# Patient Record
Sex: FEMALE | URBAN - METROPOLITAN AREA
[De-identification: names, ages, dates, MRNs, and addresses within clinical notes are randomized per-mention and may not be internally consistent; named-entity substitution may affect disease eponyms.]

---

## 2023-07-13 ENCOUNTER — OFFICE VISIT (OUTPATIENT)
Facility: LOCATION | Age: 63
End: 2023-07-13
Payer: MEDICARE

## 2023-07-13 DIAGNOSIS — H43.821 VITREOMACULAR ADHESION, RIGHT EYE: ICD-10-CM

## 2023-07-13 DIAGNOSIS — H40.1131 PRIMARY OPEN-ANGLE GLAUCOMA, MILD STAGE, BILATERAL: Primary | ICD-10-CM

## 2023-07-13 DIAGNOSIS — E10.3523: ICD-10-CM

## 2023-07-13 PROCEDURE — 92004 COMPRE OPH EXAM NEW PT 1/>: CPT | Performed by: OPHTHALMOLOGY

## 2023-07-13 ASSESSMENT — INTRAOCULAR PRESSURE
OD: 11
OS: 11

## 2023-07-13 NOTE — IMPRESSION/PLAN
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 Plan: Systemic diabetes with sign of diabetic retinopathy on dilated retinal examination today:  Discussed the pathophysiology of diabetes and its effect on the eye. Stressed the importance of strong glucose control.   Advised of importance of at least yearly dilated examinations, but to contact us immediately for any problems or concerns

## 2023-07-13 NOTE — IMPRESSION/PLAN
Impression: Primary open-angle glaucoma, mild stage, bilateral: H40.1131. Plan: Continue observation, following the findings of IOP, C/D ratio, HVF and OCT ONH. Encouraged patient compliance. 

[facility]m combigan BID OU

## 2023-07-13 NOTE — IMPRESSION/PLAN
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  Plan: Refer to [facility] for Right eye membrane stripping

## 2024-02-08 ENCOUNTER — OFFICE VISIT (OUTPATIENT)
Facility: LOCATION | Age: 64
End: 2024-02-08
Payer: MEDICARE

## 2024-02-08 DIAGNOSIS — H40.1131 PRIMARY OPEN-ANGLE GLAUCOMA, MILD STAGE, BILATERAL: Primary | ICD-10-CM

## 2024-02-08 DIAGNOSIS — H18.12 BULLOUS KERATOPATHY OF LEFT EYE: ICD-10-CM

## 2024-02-08 PROCEDURE — 92014 COMPRE OPH EXAM EST PT 1/>: CPT | Performed by: OPHTHALMOLOGY

## 2024-02-08 PROCEDURE — 92134 CPTRZ OPH DX IMG PST SGM RTA: CPT | Performed by: OPHTHALMOLOGY

## 2024-02-08 RX ORDER — ATROPINE SULFATE 10 MG/ML
1 % SOLUTION OPHTHALMIC
Qty: 10 | Refills: 0 | Status: ACTIVE
Start: 2024-02-08

## 2024-02-08 RX ORDER — PREDNISOLONE ACETATE 10 MG/ML
1 % SUSPENSION/ DROPS OPHTHALMIC
Qty: 15 | Refills: 0 | Status: ACTIVE
Start: 2024-02-08

## 2024-02-08 RX ORDER — BRIMONIDINE TARTRATE, TIMOLOL MALEATE 2; 5 MG/ML; MG/ML
SOLUTION/ DROPS OPHTHALMIC
Qty: 10 | Refills: 4 | Status: ACTIVE
Start: 2024-02-08

## 2024-02-08 ASSESSMENT — INTRAOCULAR PRESSURE
OS: 12
OD: 12